# Patient Record
Sex: FEMALE | Race: WHITE
[De-identification: names, ages, dates, MRNs, and addresses within clinical notes are randomized per-mention and may not be internally consistent; named-entity substitution may affect disease eponyms.]

---

## 2017-01-30 ENCOUNTER — HOSPITAL ENCOUNTER (OUTPATIENT)
Dept: HOSPITAL 25 - OREAST | Age: 67
Discharge: HOME | End: 2017-01-30
Attending: ORTHOPAEDIC SURGERY
Payer: COMMERCIAL

## 2017-01-30 VITALS — SYSTOLIC BLOOD PRESSURE: 152 MMHG | DIASTOLIC BLOOD PRESSURE: 63 MMHG

## 2017-01-30 DIAGNOSIS — M65.331: Primary | ICD-10-CM

## 2017-01-31 NOTE — OP
DATE OF OPERATION:  01/30/17 [facility]

 

DATE OF BIRTH:  [birth year]

 

SURGEON:  Russ Vasquez MD

 

ASSISTANT:  JA Balderrama

 

ANESTHESIOLOGIST:  None.

 

ANESTHESIA:  Local only with 1% lidocaine with epinephrine.

 

PRE-OP DIAGNOSIS:  Right middle trigger finger.

 

POST-OP DIAGNOSIS:  Right middle trigger finger.

 

OPERATIVE PROCEDURE:  Right middle finger A1 Pulley release.

 

INDICATIONS:  Eri is a 66-year-old female.  She has had a couple of years of 
triggering in the finger.  She has failed to respond to nonoperative treatment.
  We talked about risks and benefits and she desired to proceed with A1 pulley 
release of the right middle finger.

 

ESTIMATED BLOOD LOSS:  5 mL.

 

COMPLICATIONS:  None.

 

FINDINGS:  As expected.

 

DESCRIPTION OF PROCEDURE:  Eri was seen in the preoperative holding area and 
the correct side and site were marked.  We had a time-out and then I injected 
the operative area with 1% lidocaine with epinephrine.  We then came back to 
the operating room about 10 to 15 minutes later and the arm was prepped and 
draped in the usual fashion and a formal time-out was performed.

 

A 1 cm longitudinal incision was made over the A1 pulley of the right middle 
finger.  The tenotomy scissors were used to bluntly dissect the soft tissue off 
of the flexor tendon sheath.  Ragnell retractors were used to expose the sheath 
and then the 15 blade was used to longitudinally incise the A1 pulley.  The 
release was extended a little proximally and distally with the tenotomy 
scissors.  We then had her flex and extend the finger many times to see for any 
clicking or catching.  We cannot induce any triggering.  We then went ahead and 
irrigated the wound and closed it with some 5-0 nylon suture.  Wound was 
dressed with Xeroform, 4x4, and a Coban dressing.  She was taken to recovery 
room in stable condition.

 

 [id number] #: [id number]

[facility]

## 2019-08-12 ENCOUNTER — HOSPITAL ENCOUNTER (EMERGENCY)
Dept: HOSPITAL 25 - UCEAST | Age: 69
Discharge: HOME | End: 2019-08-12
Payer: COMMERCIAL

## 2019-08-12 VITALS — DIASTOLIC BLOOD PRESSURE: 70 MMHG | SYSTOLIC BLOOD PRESSURE: 143 MMHG
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
## 2019-08-12 DIAGNOSIS — Z87.891: ICD-10-CM

## 2019-08-12 DIAGNOSIS — S61.210A: Primary | ICD-10-CM

## 2019-08-12 DIAGNOSIS — I10: ICD-10-CM

## 2019-08-12 PROCEDURE — 99211 OFF/OP EST MAY X REQ PHY/QHP: CPT

## 2019-08-12 PROCEDURE — G0463 HOSPITAL OUTPT CLINIC VISIT: HCPCS

## 2019-08-12 PROCEDURE — 12001 RPR S/N/AX/GEN/TRNK 2.5CM/<: CPT

## 2019-08-12 NOTE — UC
Laceration HPI





- HPI Summary


HPI Summary: 





69 yo female presents with right index finger laceration. She tells me that she 

was closing a lid to a metal box at [facility] and her finger got caught in the lid 

- sustained a laceration to the dorsal aspect of her right index finger. Unsure 

date of her last tetanus. She bandaged the area with several band-aids and came 

to 





- History Of Current Complaint


Chief Complaint: UCLaceration


Stated Complaint: FINGER LAC


Time Seen by Provider: 08/12/19 17:40


Hx Obtained From: Patient


Laceration Location: Finger


Onset/Duration: Sudden Onset


Severity: Mild


Pain Intensity: 2


Pain Scale Used: 0-10 Numeric





- Allergies/Home Medications


Allergies/Adverse Reactions: 


 Allergies











Allergy/AdvReac Type Severity Reaction Status Date / Time


 


No Known Allergies Allergy   Verified 08/12/19 17:38














PMH/Surg Hx/FS Hx/Imm Hx


Cardiovascular History: Hypertension





- Surgical History


Surgical History: Yes


Surgery Procedure, Year, and Place: hysterectomy





- Family History


Known Family History: Positive: Non-Contributory





- Social History


Lives: With Family


Alcohol Use: Occasionally


Substance Use Type: None


Smoking Status (MU): Former Smoker


Type: Cigarettes


Length of Time of Smoking/Using Tobacco: a few times as a kid


Have You Smoked in the Last Year: No


Household Exposure Type: Cigarettes





- Immunization History


Most Recent Tetanus Shot: unknown





Review of Systems


All Other Systems Reviewed And Are Negative: Yes


Constitutional: Positive: Negative


Skin: Positive: Other - Laceration right index finger


Respiratory: Positive: Negative


Cardiovascular: Positive: Negative


Neurovascular: Positive: Negative


Musculoskeletal: Positive: Negative


Neurological: Positive: Negative


Psychological: Positive: Negative





Physical Exam





- Summary


Physical Exam Summary: 





GENERAL: NAD. WDWN. No pain distress.


SKIN: RIGHT INDEX FINGER: dorsal aspect overlying DIP 5mm superficial linear 

laceration just through the epidermis. Good approximation at rest. Clean 

appearing. No FB.


CHEST:  No accessory muscle use. Breathing comfortably and in no distress.


CV:  Pulses intact. Cap refill <2seconds


MSK: FROM right index finger DIP, PIP, and MCP.


NEURO: Alert.


PSYCH: Age appropriate behavior.





Triage Information Reviewed: Yes


Vital Signs: 


 Initial Vital Signs











Temp  99.1 F   08/12/19 17:35


 


Pulse  74   08/12/19 17:35


 


Resp  16   08/12/19 17:35


 


BP  143/70   08/12/19 17:35


 


Pulse Ox  99   08/12/19 17:35











Vital Signs Reviewed: Yes





Laceration Repair





- Laceration Repair


  ** 1


Description: Linear


Laceration Size After Repair: Length (cm) - 0.5


Modified For Repair: No


Cleansing Completed Via Routine Prep: Yes


Closure Material: Skin Adhesive


Closure Method: Single Layer


Suture Of: Skin





Laceration Course/Dx





- Course/Dx


Course Of Treatment: 





Laceration was cleansed with NS.


Laceration well approximated at rest, therefore dermabond applied. Dressed with 

a band-aid.


Pt is unsure the date of her last tetanus, but prefers to call her PCP in the 

morning and will update if needed.





- Diagnosis


Provider Diagnosis: 


 Laceration of right index finger








Discharge





- Sign-Out/Discharge


Documenting (check all that apply): Patient Departure


All imaging exams completed and their final reports reviewed: No Studies





- Discharge Plan


Condition: Stable


Disposition: HOME


Patient Education Materials:  Skin Adhesive Care (ED)


Referrals: 


Jemma Vee MD [Primary Care Provider] - 


Additional Instructions: 


If you develop a fever, shortness of breath, chest pain, new or worsening 

symptoms - please call your PCP or go to the ED immediately.


 





Your blood pressure was high at todays visit. Please see your primary provider 

within 4 weeks for recheck and re-evaluation.








Change the bandaid daily until well healed (likely 3-5 days)





Please check with your PCP tomorrow about your tetanus shot and update this if 

needed





- Billing Disposition and Condition


Condition: STABLE


Disposition: Home